# Patient Record
Sex: FEMALE | Race: WHITE | HISPANIC OR LATINO | Employment: OTHER | URBAN - METROPOLITAN AREA
[De-identification: names, ages, dates, MRNs, and addresses within clinical notes are randomized per-mention and may not be internally consistent; named-entity substitution may affect disease eponyms.]

---

## 2017-05-23 ENCOUNTER — TRANSFERRED RECORDS (OUTPATIENT)
Dept: HEALTH INFORMATION MANAGEMENT | Facility: CLINIC | Age: 32
End: 2017-05-23

## 2017-06-01 ENCOUNTER — TRANSFERRED RECORDS (OUTPATIENT)
Dept: HEALTH INFORMATION MANAGEMENT | Facility: CLINIC | Age: 32
End: 2017-06-01

## 2017-06-05 ENCOUNTER — MEDICAL CORRESPONDENCE (OUTPATIENT)
Dept: HEALTH INFORMATION MANAGEMENT | Facility: CLINIC | Age: 32
End: 2017-06-05

## 2017-06-21 DIAGNOSIS — H47.10 PAPILLEDEMA: ICD-10-CM

## 2017-06-21 DIAGNOSIS — H53.10 SUBJECTIVE VISUAL DISTURBANCE: ICD-10-CM

## 2017-06-22 ENCOUNTER — OFFICE VISIT (OUTPATIENT)
Dept: OPHTHALMOLOGY | Facility: CLINIC | Age: 32
End: 2017-06-22
Attending: OPHTHALMOLOGY
Payer: COMMERCIAL

## 2017-06-22 DIAGNOSIS — H53.10 SUBJECTIVE VISUAL DISTURBANCE: ICD-10-CM

## 2017-06-22 DIAGNOSIS — H47.333 PSEUDOPAPILLEDEMA, BILATERAL: Primary | ICD-10-CM

## 2017-06-22 PROCEDURE — 99212 OFFICE O/P EST SF 10 MIN: CPT | Mod: ZF

## 2017-06-22 PROCEDURE — 92083 EXTENDED VISUAL FIELD XM: CPT | Mod: ZF | Performed by: OPHTHALMOLOGY

## 2017-06-22 PROCEDURE — 92133 CPTRZD OPH DX IMG PST SGM ON: CPT | Mod: ZF | Performed by: OPHTHALMOLOGY

## 2017-06-22 ASSESSMENT — VISUAL ACUITY
OS_CC: J1+
OD_CC: 20/20
OD_CC+: -1
CORRECTION_TYPE: GLASSES
OS_CC: 20/20
OD_CC: J1+
METHOD: SNELLEN - LINEAR

## 2017-06-22 ASSESSMENT — REFRACTION_WEARINGRX
OS_AXIS: 074
OD_SPHERE: -4.50
OD_AXIS: 100
OD_CYLINDER: +0.50
OS_SPHERE: -4.25
SPECS_TYPE: SVL
OS_CYLINDER: +0.50

## 2017-06-22 ASSESSMENT — CUP TO DISC RATIO
OD_RATIO: 0.1
OS_RATIO: 0.1

## 2017-06-22 ASSESSMENT — REFRACTION_MANIFEST
OS_CYLINDER: +0.50
OS_AXIS: 074
OS_SPHERE: -4.25
OD_CYLINDER: +0.50
OD_AXIS: 100
OD_SPHERE: -4.50

## 2017-06-22 ASSESSMENT — SLIT LAMP EXAM - LIDS
COMMENTS: NORMAL
COMMENTS: NORMAL

## 2017-06-22 ASSESSMENT — EXTERNAL EXAM - RIGHT EYE: OD_EXAM: NORMAL

## 2017-06-22 ASSESSMENT — EXTERNAL EXAM - LEFT EYE: OS_EXAM: NORMAL

## 2017-06-22 ASSESSMENT — TONOMETRY
OS_IOP_MMHG: 20
OD_IOP_MMHG: 20
IOP_METHOD: TONOPEN

## 2017-06-22 ASSESSMENT — CONF VISUAL FIELD
OD_NORMAL: 1
OS_NORMAL: 1

## 2017-06-22 NOTE — NURSING NOTE
Chief Complaints and History of Present Illnesses   Patient presents with     Follow Up For     s/p Papilledema; Subjective visual disturbance     HPI    Affected eye(s):  Both   Symptoms:     No blurred vision   No decreased vision   No distorted vision   No double vision   No floaters   Flashes (Comment:  OU when headaches are intense.)      Duration:  3 months   Frequency:  Constant       Do you have eye pain now?:  No      Comments:  Pt. stated constant headache for the last 3 months along with the sensation of pressure behind left eye and intermittent stabbing pain from movement of her head. No double vision.  Jeramy Valencia  2:42 PM June 22, 2017

## 2017-06-22 NOTE — PROGRESS NOTES
ATTENDING ASSESSMENT AND PLAN:       1. Clinical concern for pseudotumor cerebri based upon symptoms and elevated opening pressure on lumbar puncture.  I think pseudotumor cerebri without papilledema is highly unlikely in her case and believe she has a primary headache disorder.  2. No evidence of papilledema on exam- normal neuro-ophthalmologic exam today    Tobi Dominguez is a pleasant 32 year old female who presents to my neuro-ophthalmology clinic today for evaluation of possible pseudotumour cerebri. She started to have worsening headaches, that she describes as a very short episodes of sharp, stabbing headache at the left side of her head that is usually triggered with valsalva maneuvers. She has longstanding migraine for many years. She has pulsatile tinnitus bilaterally. She recalls an episode of vision loss in the left eye that lasted for about 2 hours upon wakening up and that happened about 2 months ago- her vision then resolved back to baseline. She was sent to see Dr. Rothman about a month ago. She had a brain MRI/MRV/MRA which was unremarkable and lumbar puncture with opening pressure 32 cm H2O in prone position. Gained about 10 lbs over the last year. No medications associated with increased intracranial pressure.  Last eye exam was March or April of 2016, no papilledema noted at that time (or in the past).    Automated visual field testing in both sides was full. OCT of the peripapillary nerve fibre layer was normal.     In conclusion, Mrs. Dominguez presents today for evaluation of headache with possible pseudotumor cerebri. Today, her examination showed NO PAPILLEDEMA in either eye nor was there any subtle suggestion of prior / healed optic disc edema. We have discussed that pseudotumor cerebri without papilledema is exceptionally rare while primary headache disorder is very common.  I suspect her elevated opening pressure was falsely elevated.  While she could in theory have pseudotumor cerebri, it  is highly unlikely.  I suggest she attempt weight loss as 10% of weight loss from the time of diagnosis of pseudotumor cerebri (in patient's with definitive pseudotumor cerebri) leads to a resolution of symptoms in most patients.  If she were to have pseudotumor cerebri without papilledema she would be at NO risk whatsoever for vision loss as the mechanism for vision loss in pseudotumor cerebri is papilledema.     I recommended the patient follow-up again with her Neurologist from [facility] and consider prophylaxis headache treatment.  It sounds like her Neurologist wanted to start topiramate but the patient declined that recommendation previously.  I advised her that I agree that this would be an excellent medication for her given it's benefits for increased intracranial pressure (which I believe is a minimal factor in her case), weight loss, and most primary headache disorders.  Diamox, however, is not particularly effective for most primary headache disorders.    I did not make a follow-up appointment, but I would be happy to see the patient back in the future should any new neuro-ophthalmic concern arise or should she have significant vision changes.        Complete documentation of historical and exam elements from today's encounter can be found in the full encounter summary report (not reduplicated in this progress note).  I personally obtained the chief complaint(s) and history of present illness.  I confirmed and edited as necessary the review of systems, past medical/surgical history, family history, social history, and examination findings as documented by others; and I examined the patient myself.  I personally reviewed the relevant tests, images, and reports as documented above.  I formulated and edited as necessary the assessment and plan and discussed the findings and management plan with the patient and family   Jose Brar MD

## 2017-06-22 NOTE — LETTER
2017    RE: Tobi Dominguez  : 1985  MRN: [id number]    Dear JERRY Rothman,    Thank you for referring your patient, Tobi Dominguez, to my neuro-ophthalmology clinic recently.  After a thorough neuro-ophthalmic history and examination, I came to the following conclusions:     1. Clinical concern for pseudotumor cerebri based upon symptoms and elevated opening pressure on lumbar puncture.  I think pseudotumor cerebri without papilledema is highly unlikely in her case and believe she has a primary headache disorder.  2. No evidence of papilledema on exam- normal neuro-ophthalmologic exam today    Tobi Dominguez is a pleasant 32 year old female who presents to my neuro-ophthalmology clinic today for evaluation of possible pseudotumour cerebri. She started to have worsening headaches, that she describes as a very short episodes of sharp, stabbing headache at the left side of her head that is usually triggered with valsalva maneuvers. She has longstanding migraine for many years. She has pulsatile tinnitus bilaterally. She recalls an episode of vision loss in the left eye that lasted for about 2 hours upon wakening up and that happened about 2 months ago- her vision then resolved back to baseline. She was sent to see Dr. Rothman about a month ago. She had a brain MRI/MRV/MRA which was unremarkable and lumbar puncture with opening pressure 32 cm H2O in prone position. Gained about 10 lbs over the last year. No medications associated with increased intracranial pressure.  Last eye exam was March or 2016, no papilledema noted at that time (or in the past).    Automated visual field testing in both sides was full. OCT of the peripapillary nerve fibre layer was normal.     In conclusion, Mrs. Dominguez presents today for evaluation of headache with possible pseudotumor cerebri. Today, her examination showed NO PAPILLEDEMA in either eye nor was there any subtle suggestion of prior / healed optic disc edema.  We have discussed that pseudotumor cerebri without papilledema is exceptionally rare while primary headache disorder is very common.  I suspect her elevated opening pressure was falsely elevated.  While she could in theory have pseudotumor cerebri, it is highly unlikely.  I suggest she attempt weight loss as 10% of weight loss from the time of diagnosis of pseudotumor cerebri (in patient's with definitive pseudotumor cerebri) leads to a resolution of symptoms in most patients.  If she were to have pseudotumor cerebri without papilledema she would be at NO risk whatsoever for vision loss as the mechanism for vision loss in pseudotumor cerebri is papilledema.     I recommended the patient follow-up again with her Neurologist from [facility] and consider prophylaxis headache treatment.  It sounds like her Neurologist wanted to start topiramate but the patient declined that recommendation previously.  I advised her that I agree that this would be an excellent medication for her given it's benefits for increased intracranial pressure (which I believe is a minimal factor in her case), weight loss, and most primary headache disorders.  Diamox, however, is not particularly effective for most primary headache disorders.    I did not make a follow-up appointment, but I would be happy to see the patient back in the future should any new neuro-ophthalmic concern arise or should she have significant vision changes.    Again, thank you for trusting me with the care of your patient.  For further exam details, please feel free to contact our office for additional records.  If you wish to contact me regarding this patient please email me at [email or web address] or give my clinic a call to arrange a phone conversation.    Sincerely,    Jose Brar MD  , Neuro-Ophthalmology and Adult Strabismus  Department of Ophthalmology and Visual Neurosciences  [facility]    DX: primary headache disorder

## 2017-06-22 NOTE — MR AVS SNAPSHOT
After Visit Summary   2017    Tobi Dominguez    MRN: [id number]           Patient Information     Date Of Birth          1985        Visit Information        Provider Department      2017 2:00 PM Jose Brar MD [facility]        Today's Diagnoses     Pseudopapilledema, bilateral - Both Eyes    -  1    Subjective visual disturbance           Follow-ups after your visit        Who to contact     Please call your clinic at [phone] to:    Ask questions about your health    Make or cancel appointments    Discuss your medicines    Learn about your test results    Speak to your doctor   If you have compliments or concerns about an experience at your clinic, or if you wish to file a complaint, please contact [facility] Patient Relations at [phone] or email us at [email or web address]         Additional Information About Your Visit        MyChart Information     [employer] is an electronic gateway that provides easy, online access to your medical records. With RUSBASE, you can request a clinic appointment, read your test results, renew a prescription or communicate with your care team.     To sign up for [employer] visit the website at [email or web address]   You will be asked to enter the access code listed below, as well as some personal information. Please follow the directions to create your username and password.     Your access code is: [email or web address]  Expires: 9/10/2017  6:31 AM     Your access code will  in 90 days. If you need help or a new code, please contact your [facility] Clinic or call [phone] for assistance.        Care EveryWhere ID     This is your Care EveryWhere ID. This could be used by other organizations to access your [place] medical records  [id number]         Blood Pressure from Last 3 Encounters:   10/27/06 118/76    Weight from Last 3 Encounters:   10/27/06 96.6 kg (213  lb)              We Performed the Following     Glaucoma Top OU     OCT Optic Nerve RNFL Spectralis OU (both eyes)        Primary Care Provider    No Ref-Primary Verified       No address on file        Equal Access to Services     MALV[facility] : Hadii aad ku hadjavierabbie Lopez, rena hinaldha, sofía ibarra, molina mcgowandereck stacy. So [place] [phone].    ATENCIÓN: Si habla español, tiene a lee disposición servicios gratuitos de asistencia lingüística. Llame al [phone].    We comply with applicable federal civil rights laws and [place] laws. We do not discriminate on the basis of race, color, national origin, age, disability sex, sexual orientation or gender identity.            Thank you!     Thank you for choosing [facility]  for your care. Our goal is always to provide you with excellent care. Hearing back from our patients is one way we can continue to improve our services. Please take a few minutes to complete the written survey that you may receive in the mail after your visit with us. Thank you!             Your Updated Medication List - Protect others around you: Learn how to safely use, store and throw away your medicines at www.disposemymeds.org.          This list is accurate as of: 6/22/17 11:59 PM.  Always use your most recent med list.                   Brand Name Dispense Instructions for use Diagnosis    PERCOCET 5-325 MG per tablet   Generic drug:  oxyCODONE-acetaminophen     28    ONE TO TWO TABLETS EVERY 4 TO 6 HOURS AS NEEDED FOR PAIN    Abdominal pain, right lower quadrant       VICODIN PO      1 TABLET EVERY 4 TO 6 HOURS AS NEEDED

## 2018-09-09 ENCOUNTER — HOSPITAL ENCOUNTER (EMERGENCY)
Facility: CLINIC | Age: 33
Discharge: HOME | End: 2018-09-09
Attending: FAMILY MEDICINE | Admitting: FAMILY MEDICINE
Payer: COMMERCIAL

## 2018-09-09 ENCOUNTER — APPOINTMENT (OUTPATIENT)
Dept: CT IMAGING | Facility: CLINIC | Age: 33
End: 2018-09-09
Attending: FAMILY MEDICINE
Payer: COMMERCIAL

## 2018-09-09 VITALS
OXYGEN SATURATION: 99 % | RESPIRATION RATE: 20 BRPM | DIASTOLIC BLOOD PRESSURE: 83 MMHG | SYSTOLIC BLOOD PRESSURE: 131 MMHG | HEART RATE: 59 BPM | TEMPERATURE: 97 F

## 2018-09-09 DIAGNOSIS — G89.29 CHRONIC ABDOMINAL PAIN: ICD-10-CM

## 2018-09-09 DIAGNOSIS — R10.9 CHRONIC ABDOMINAL PAIN: ICD-10-CM

## 2018-09-09 LAB
ALBUMIN SERPL-MCNC: 3.4 G/DL (ref 3.4–5)
ALBUMIN UR-MCNC: NEGATIVE MG/DL
ALP SERPL-CCNC: 66 U/L (ref 40–150)
ALT SERPL W P-5'-P-CCNC: 24 U/L (ref 0–50)
ANION GAP SERPL CALCULATED.3IONS-SCNC: 10 MMOL/L (ref 3–14)
APPEARANCE UR: CLEAR
AST SERPL W P-5'-P-CCNC: 16 U/L (ref 0–45)
BASOPHILS # BLD AUTO: 0 10E9/L (ref 0–0.2)
BASOPHILS NFR BLD AUTO: 0.5 %
BILIRUB SERPL-MCNC: 0.3 MG/DL (ref 0.2–1.3)
BILIRUB UR QL STRIP: NEGATIVE
BUN SERPL-MCNC: 13 MG/DL (ref 7–30)
CALCIUM SERPL-MCNC: 8.8 MG/DL (ref 8.5–10.1)
CHLORIDE SERPL-SCNC: 105 MMOL/L (ref 94–109)
CO2 SERPL-SCNC: 29 MMOL/L (ref 20–32)
COLOR UR AUTO: COLORLESS
CREAT SERPL-MCNC: 0.63 MG/DL (ref 0.52–1.04)
DIFFERENTIAL METHOD BLD: ABNORMAL
EOSINOPHIL NFR BLD AUTO: 2.4 %
ERYTHROCYTE [DISTWIDTH] IN BLOOD BY AUTOMATED COUNT: 12.2 % (ref 10–15)
GFR SERPL CREATININE-BSD FRML MDRD: >90 ML/MIN/1.7M2
GLUCOSE SERPL-MCNC: 85 MG/DL (ref 70–99)
GLUCOSE UR STRIP-MCNC: NEGATIVE MG/DL
HCT VFR BLD AUTO: 44.2 % (ref 35–47)
HGB BLD-MCNC: 14.7 G/DL (ref 11.7–15.7)
HGB UR QL STRIP: NEGATIVE
IMM GRANULOCYTES # BLD: 0 10E9/L (ref 0–0.4)
IMM GRANULOCYTES NFR BLD: 0.1 %
KETONES UR STRIP-MCNC: NEGATIVE MG/DL
LEUKOCYTE ESTERASE UR QL STRIP: NEGATIVE
LIPASE SERPL-CCNC: 150 U/L (ref 73–393)
LYMPHOCYTES # BLD AUTO: 2.8 10E9/L (ref 0.8–5.3)
LYMPHOCYTES NFR BLD AUTO: 38.5 %
MCH RBC QN AUTO: 28.1 PG (ref 26.5–33)
MCHC RBC AUTO-ENTMCNC: 33.3 G/DL (ref 31.5–36.5)
MCV RBC AUTO: 85 FL (ref 78–100)
MONOCYTES # BLD AUTO: 0.5 10E9/L (ref 0–1.3)
MONOCYTES NFR BLD AUTO: 7.1 %
NEUTROPHILS # BLD AUTO: 3.8 10E9/L (ref 1.6–8.3)
NEUTROPHILS NFR BLD AUTO: 51.4 %
NITRATE UR QL: NEGATIVE
NRBC # BLD AUTO: 0 10*3/UL
NRBC BLD AUTO-RTO: 0 /100
PH UR STRIP: 6 PH (ref 5–7)
PLATELET # BLD AUTO: 313 10E9/L (ref 150–450)
POTASSIUM SERPL-SCNC: 3.8 MMOL/L (ref 3.4–5.3)
PROT SERPL-MCNC: 7.1 G/DL (ref 6.8–8.8)
RBC # BLD AUTO: 5.23 10E12/L (ref 3.8–5.2)
RBC #/AREA URNS AUTO: 0 /HPF (ref 0–2)
SODIUM SERPL-SCNC: 144 MMOL/L (ref 133–144)
SOURCE: ABNORMAL
SP GR UR STRIP: 1.04 (ref 1–1.03)
SQUAMOUS #/AREA URNS AUTO: 1 /HPF (ref 0–1)
UROBILINOGEN UR STRIP-MCNC: 0 MG/DL (ref 0–2)
WBC # BLD AUTO: 7.4 10E9/L (ref 4–11)
WBC #/AREA URNS AUTO: <1 /HPF (ref 0–5)

## 2018-09-09 PROCEDURE — 99284 EMERGENCY DEPT VISIT MOD MDM: CPT | Mod: Z6 | Performed by: FAMILY MEDICINE

## 2018-09-09 PROCEDURE — 25000128 H RX IP 250 OP 636: Performed by: FAMILY MEDICINE

## 2018-09-09 PROCEDURE — 85025 COMPLETE CBC W/AUTO DIFF WBC: CPT | Performed by: FAMILY MEDICINE

## 2018-09-09 PROCEDURE — 25000125 ZZHC RX 250: Performed by: FAMILY MEDICINE

## 2018-09-09 PROCEDURE — 80053 COMPREHEN METABOLIC PANEL: CPT | Performed by: FAMILY MEDICINE

## 2018-09-09 PROCEDURE — 74177 CT ABD & PELVIS W/CONTRAST: CPT

## 2018-09-09 PROCEDURE — 99285 EMERGENCY DEPT VISIT HI MDM: CPT | Mod: 25 | Performed by: FAMILY MEDICINE

## 2018-09-09 PROCEDURE — 83690 ASSAY OF LIPASE: CPT | Performed by: FAMILY MEDICINE

## 2018-09-09 PROCEDURE — 81001 URINALYSIS AUTO W/SCOPE: CPT | Performed by: FAMILY MEDICINE

## 2018-09-09 RX ORDER — IOPAMIDOL 755 MG/ML
100 INJECTION, SOLUTION INTRAVASCULAR ONCE
Status: COMPLETED | OUTPATIENT
Start: 2018-09-09 | End: 2018-09-09

## 2018-09-09 RX ADMIN — SODIUM CHLORIDE 1000 ML: 9 INJECTION, SOLUTION INTRAVENOUS at 01:59

## 2018-09-09 RX ADMIN — IOPAMIDOL 99 ML: 755 INJECTION, SOLUTION INTRAVENOUS at 01:00

## 2018-09-09 RX ADMIN — SODIUM CHLORIDE 60 ML: 9 INJECTION, SOLUTION INTRAVENOUS at 01:00

## 2018-09-09 NOTE — ED AVS SNAPSHOT
[facility]    [address] [place] [address]    Phone:  [phone]    Fax:  [phone]                                       Tobi Dominguez   MRN: [id number]    Department:  [facility]   Date of Visit:  9/8/2018           Patient Information     Date Of Birth          1985        Your diagnoses for this visit were:     Chronic abdominal pain        You were seen by Vineet Leblanc MD.      Follow-up Information     Follow up with Your doctor. Schedule an appointment as soon as possible for a visit in 5 days.    Why:  If not improving.      Discharge References/Attachments     ABDOMINAL PAIN, UNKNOWN CAUSE, (FEMALE) (ENGLISH)      24 Hour Appointment Hotline       To make an appointment at any [facility] clinic, call [phone] ([phone]). If you don't have a family doctor or clinic, we will help you find one. [facility] clinics are conveniently located to serve the needs of you and your family.             Review of your medicines      Our records show that you are taking the medicines listed below. If these are incorrect, please call your family doctor or clinic.        Dose / Directions Last dose taken    PERCOCET 5-325 MG per tablet   Quantity:  28   Generic drug:  oxyCODONE-acetaminophen        ONE TO TWO TABLETS EVERY 4 TO 6 HOURS AS NEEDED FOR PAIN   Refills:  0        VICODIN PO        1 TABLET EVERY 4 TO 6 HOURS AS NEEDED   Refills:  0                Procedures and tests performed during your visit     CBC with platelets differential    CT Abdomen Pelvis w Contrast    Comprehensive metabolic panel    Give 20 ounces of water 15 minutes before CT of abdomen    Lipase    Peripheral IV catheter    UA with Microscopic      Orders Needing Specimen Collection     None      Pending Results     No orders found from 9/7/2018 to 9/10/2018.            Pending Culture Results     No orders found from 9/7/2018 to 9/10/2018.     "        Pending Results Instructions     If you had any lab results that were not finalized at the time of your Discharge, you can call the ED Lab Result RN at [phone]. You will be contacted by this team for any positive Lab results or changes in treatment. The nurses are available 7 days a week from 10A to 6:30P.  You can leave a message 24 hours per day and they will return your call.        Thank you for choosing [facility]       Thank you for choosing [facility] for your care. Our goal is always to provide you with excellent care. Hearing back from our patients is one way we can continue to improve our services. Please take a few minutes to complete the written survey that you may receive in the mail after you visit with us. Thank you!        [employer] Information     [employer] lets you send messages to your doctor, view your test results, renew your prescriptions, schedule appointments and more. To sign up, go to [email or web address] . Click on \"Log in\" on the left side of the screen, which will take you to the Welcome page. Then click on \"Sign up Now\" on the right side of the page.     You will be asked to enter the access code listed below, as well as some personal information. Please follow the directions to create your username and password.     Your access code is: [email or web address]  Expires: 2018  2:19 AM     Your access code will  in 90 days. If you need help or a new code, please call your [facility] clinic or [phone].        Care EveryWhere ID     This is your Care EveryWhere ID. This could be used by other organizations to access your [facility] medical records  [id number]        Equal Access to Services     [facility]: Hadii bailey Lopez, wadesireeda luqadaha, qajoseline stacyalmolina chadwick. So [facility] [phone].    ATENCIÓN: Si habla español, tiene a lee disposición servicios gratuitos de asistencia lingüística. Llame al [phone].    We " comply with applicable federal civil rights laws and [place] laws. We do not discriminate on the basis of race, color, national origin, age, disability, sex, sexual orientation, or gender identity.            After Visit Summary       This is your record. Keep this with you and show to your community pharmacist(s) and doctor(s) at your next visit.

## 2018-09-09 NOTE — ED TRIAGE NOTES
Pt had a colonoscopy on Friday via [facility]. A biopsy was done. Now have acute abd pain.

## 2018-09-09 NOTE — ED AVS SNAPSHOT
[facility]    [address] [place] [address]    Phone:  [phone]    Fax:  [phone]                                       Tobi Dominguez   MRN: [id number]    Department:  [facility]   Date of Visit:  9/8/2018           After Visit Summary Signature Page     I have received my discharge instructions, and my questions have been answered. I have discussed any challenges I see with this plan with the nurse or doctor.    ..........................................................................................................................................  Patient/Patient Representative Signature      ..........................................................................................................................................  Patient Representative Print Name and Relationship to Patient    ..................................................               ................................................  Date                                            Time    ..........................................................................................................................................  Reviewed by Signature/Title    ...................................................              ..............................................  Date                                                            Time          22EPIC Rev 08/18

## 2018-09-09 NOTE — ED PROVIDER NOTES
History     Chief Complaint   Patient presents with     Abdominal Pain     HPI  Tobi Dominguez is a 33 year old female who presents with abdominal pain that started after recent colonoscopy.  Patient had a colonoscopy done 36 hours ago.  This is done for evaluation of some chronic abdominal pain.  A biopsy was done.  Patient states that she did have some bloody stools on Friday but then none on Saturday.  Patient states the pain started relatively soon after the procedure.  Is been getting progressively worse.  She states the pain is mostly in the lower abdomen area.  Nothing seems to make the pain better or worse.    Problem List:    Patient Active Problem List    Diagnosis Date Noted     Ovarian cyst 10/27/2006     Priority: Medium     Problem list name updated by automated process. Provider to review          Past Medical History:    Past Medical History:   Diagnosis Date     Other forms of migraine, with intractable migraine, so stated, without mention of status migrainosus        Past Surgical History:    Past Surgical History:   Procedure Laterality Date     C  DELIVERY ONLY         Family History:    Family History   Problem Relation Age of Onset     Neurologic Disorder Mother      maigraines     Alcohol/Drug Father      Genitourinary Problems Son      kidney condition       Social History:  Marital Status:   [2]  Social History   Substance Use Topics     Smoking status: Never Smoker     Smokeless tobacco: Not on file     Alcohol use No        Medications:      PERCOCET 5-325 MG OR TABS   VICODIN OR         Review of Systems   All other systems reviewed and are negative.      Physical Exam   BP: 131/83  Pulse: 59  Temp: 97  F (36.1  C)  Resp: 20  SpO2: 99 %      Physical Exam   Constitutional: She appears well-developed and well-nourished. No distress.   HENT:   Head: Normocephalic and atraumatic.   Mouth/Throat: Oropharynx is clear and moist. No oropharyngeal exudate.   Eyes: Conjunctivae  are normal.   Neck: Normal range of motion.   Cardiovascular: Normal rate, regular rhythm and normal heart sounds.    No murmur heard.  Pulmonary/Chest: Effort normal and breath sounds normal. No respiratory distress. She has no wheezes.   Abdominal: Soft. Bowel sounds are normal. She exhibits no distension. There is tenderness (suprapubic). There is no rebound and no guarding.   Musculoskeletal: Normal range of motion. She exhibits no edema.   Skin: She is not diaphoretic.   Nursing note and vitals reviewed.      ED Course     ED Course     Procedures           Results for orders placed or performed during the hospital encounter of 09/09/18   CT Abdomen Pelvis w Contrast    Narrative    CT ABDOMEN AND PELVIS WITH CONTRAST   9/9/2018 1:12 AM     HISTORY: Recent colonoscopy with biopsy. Abdominal pain.    COMPARISON: 11/1/2006.    TECHNIQUE: Following the uneventful administration of 99 mL Isovue-370  intravenous contrast, helical sections were acquired from the top of  the diaphragm through the pubic symphysis. Coronal reconstructions  were generated. Radiation dose for this scan was reduced using  automated exposure control, adjustment of the mA and/or kV according  to the patient's size, or iterative reconstruction technique.    FINDINGS:     ABDOMEN: The liver, spleen, pancreas, adrenal glands and right kidney  are unremarkable. Subcentimeter low-attenuation lesion in the upper  pole of the left kidney, too small to characterize. The gallbladder is  present. No enlarged lymph nodes or free fluid in the upper abdomen.    Scan through the lower chest is unremarkable.    Pelvis: The small and large bowel are normal in caliber. The appendix  is not visualized. No bowel wall thickening, pneumatosis or free  intraperitoneal gas. The uterus is not visualized. No enlarged lymph  nodes or free fluid in the pelvis.      Impression    IMPRESSION:  1. No cause of acute pain identified in the abdomen or pelvis.  2. No  evidence of bowel perforation.   CBC with platelets differential   Result Value Ref Range    WBC 7.4 4.0 - 11.0 10e9/L    RBC Count 5.23 (H) 3.8 - 5.2 10e12/L    Hemoglobin 14.7 11.7 - 15.7 g/dL    Hematocrit 44.2 35.0 - 47.0 %    MCV 85 78 - 100 fl    MCH 28.1 26.5 - 33.0 pg    MCHC 33.3 31.5 - 36.5 g/dL    RDW 12.2 10.0 - 15.0 %    Platelet Count 313 150 - 450 10e9/L    Diff Method Automated Method     % Neutrophils 51.4 %    % Lymphocytes 38.5 %    % Monocytes 7.1 %    % Eosinophils 2.4 %    % Basophils 0.5 %    % Immature Granulocytes 0.1 %    Nucleated RBCs 0 0 /100    Absolute Neutrophil 3.8 1.6 - 8.3 10e9/L    Absolute Lymphocytes 2.8 0.8 - 5.3 10e9/L    Absolute Monocytes 0.5 0.0 - 1.3 10e9/L    Absolute Basophils 0.0 0.0 - 0.2 10e9/L    Abs Immature Granulocytes 0.0 0 - 0.4 10e9/L    Absolute Nucleated RBC 0.0    Comprehensive metabolic panel   Result Value Ref Range    Sodium 144 133 - 144 mmol/L    Potassium 3.8 3.4 - 5.3 mmol/L    Chloride 105 94 - 109 mmol/L    Carbon Dioxide 29 20 - 32 mmol/L    Anion Gap 10 3 - 14 mmol/L    Glucose 85 70 - 99 mg/dL    Urea Nitrogen 13 7 - 30 mg/dL    Creatinine 0.63 0.52 - 1.04 mg/dL    GFR Estimate >90 >60 mL/min/1.7m2    GFR Estimate If Black >90 >60 mL/min/1.7m2    Calcium 8.8 8.5 - 10.1 mg/dL    Bilirubin Total 0.3 0.2 - 1.3 mg/dL    Albumin 3.4 3.4 - 5.0 g/dL    Protein Total 7.1 6.8 - 8.8 g/dL    Alkaline Phosphatase 66 40 - 150 U/L    ALT 24 0 - 50 U/L    AST 16 0 - 45 U/L   Lipase   Result Value Ref Range    Lipase 150 73 - 393 U/L   UA with Microscopic   Result Value Ref Range    Color Urine Colorless     Appearance Urine Clear     Glucose Urine Negative NEG^Negative mg/dL    Bilirubin Urine Negative NEG^Negative    Ketones Urine Negative NEG^Negative mg/dL    Specific Gravity Urine 1.042 (H) 1.003 - 1.035    Blood Urine Negative NEG^Negative    pH Urine 6.0 5.0 - 7.0 pH    Protein Albumin Urine Negative NEG^Negative mg/dL    Urobilinogen mg/dL 0.0 0.0 - 2.0  mg/dL    Nitrite Urine Negative NEG^Negative    Leukocyte Esterase Urine Negative NEG^Negative    Source Unspecified Urine     WBC Urine <1 0 - 5 /HPF    RBC Urine 0 0 - 2 /HPF    Squamous Epithelial /HPF Urine 1 0 - 1 /HPF     Medications   0.9% sodium chloride BOLUS (1,000 mLs Intravenous New Bag 9/9/18 0159)   sodium chloride (PF) 0.9% PF flush 3 mL (3 mLs Intracatheter Given 9/9/18 0100)   iopamidol (ISOVUE-370) solution 100 mL (99 mLs Intravenous Given 9/9/18 0100)   sodium chloride 0.9 % bag 250mL for CT scan flush use (60 mLs Intravenous Given 9/9/18 0100)     Colonoscopy shows no signs of any complications.  Labs and urine were unremarkable.  At this point I do not have a good explanation for the patient's abdominal pain but it seems like we have ruled out any emergent medical condition.  This could be an exacerbation of her chronic abdominal pain.  This also could be more gas pain from her recent colonoscopy.  At this point it is safe to discharge the patient home.  Patient will follow-up with her doctor next week as previously scheduled.    Assessments & Plan (with Medical Decision Making)  Chronic abdominal pain     I have reviewed the nursing notes.    I have reviewed the findings, diagnosis, plan and need for follow up with the patient.        9/8/2018   [facility] EMERGENCY DEPARTMENT     Vineet Leblanc MD  09/09/18 0208

## 2019-01-24 ENCOUNTER — HOSPITAL ENCOUNTER (EMERGENCY)
Facility: CLINIC | Age: 34
Discharge: HOME | End: 2019-01-24
Attending: FAMILY MEDICINE | Admitting: FAMILY MEDICINE
Payer: COMMERCIAL

## 2019-01-24 VITALS
DIASTOLIC BLOOD PRESSURE: 95 MMHG | OXYGEN SATURATION: 96 % | SYSTOLIC BLOOD PRESSURE: 152 MMHG | HEART RATE: 80 BPM | RESPIRATION RATE: 20 BRPM | TEMPERATURE: 98 F

## 2019-01-24 DIAGNOSIS — T78.40XA ALLERGIC REACTION TO DRUG, INITIAL ENCOUNTER: ICD-10-CM

## 2019-01-24 PROCEDURE — 25000132 ZZH RX MED GY IP 250 OP 250 PS 637: Performed by: FAMILY MEDICINE

## 2019-01-24 PROCEDURE — 99283 EMERGENCY DEPT VISIT LOW MDM: CPT | Performed by: FAMILY MEDICINE

## 2019-01-24 PROCEDURE — 99284 EMERGENCY DEPT VISIT MOD MDM: CPT | Mod: Z6 | Performed by: FAMILY MEDICINE

## 2019-01-24 RX ORDER — CETIRIZINE HYDROCHLORIDE 10 MG/1
10 TABLET ORAL 2 TIMES DAILY PRN
Qty: 30 TABLET | Refills: 0 | Status: SHIPPED | OUTPATIENT
Start: 2019-01-24 | End: 2019-02-23

## 2019-01-24 RX ORDER — ACETAMINOPHEN 500 MG
1000 TABLET ORAL
COMMUNITY
Start: 2018-05-08

## 2019-01-24 RX ORDER — CETIRIZINE HYDROCHLORIDE 10 MG/1
10 TABLET ORAL ONCE
Status: COMPLETED | OUTPATIENT
Start: 2019-01-24 | End: 2019-01-24

## 2019-01-24 RX ORDER — PREDNISONE 20 MG/1
40 TABLET ORAL
COMMUNITY
Start: 2019-01-22 | End: 2019-01-24

## 2019-01-24 RX ORDER — VERAPAMIL HYDROCHLORIDE 80 MG/1
80 TABLET ORAL
COMMUNITY
Start: 2019-01-03 | End: 2020-01-03

## 2019-01-24 RX ADMIN — CETIRIZINE HYDROCHLORIDE 10 MG: 10 TABLET, FILM COATED ORAL at 00:41

## 2019-01-24 NOTE — ED TRIAGE NOTES
Pt presents with probable medication reaction. Pt took prednisone and Augmentin for URI on Tuesday. Pt took benadryl 4 hours ago.

## 2019-01-24 NOTE — ED PROVIDER NOTES
History     Chief Complaint   Patient presents with     Allergic Reaction     Pt took amoxicillin and prednison at 1200. Itching, tingling. Taking for URI.      HPI  Tobi Dominguez is a 34 year old female who resents to the ED this morning by ambulance with a possible allergic reaction to medication.    She has had an upper respiratory infection with some sinus congestion for the past couple of weeks with low-grade fever.  She was seen in a clinic the Tuesday night and prescribed Augmentin for possible sinusitis and prednisone because she had some wheezing.  It was too late to get her prescriptions filled so she did not take the first dose of either the medications until Wednesday around noon, just over 12 hours ago.  Shortly thereafter she started getting itching and swelling of the bottoms of her feet.  Felt like there was something in her shoe.  She denied ever having any throat swelling, wheezing, abdominal discomfort or diarrhea.  Never developed any rash.      She called the triage nurse at 6:12 PM and was told to take Benadryl and if not improved or worse to go to the ED.  At that time her heart rate was 102 as measured by her blood pressure machine.  She called the triage nurse again at 11:14 PM tonight and her blood pressure at that time was 187/124 with a heart rate of 120.  She had a slight cough and felt a little short of breath and the triage nurse told her to call 911.    She was stable in route.  Last time she took Benadryl was about 4 hours ago.  She states that the swelling and itchiness of the soles of her feet has settled down.  She denies any swelling sensation of the throat.  No shortness of breath or wheezing.  No hives or rash.  She is not certain why she was told to call an ambulance.        Allergies:  Allergies   Allergen Reactions     Hydromorphone Cough and Shortness Of Breath     Other reaction(s): Difficulty breathing     Augmentin      Cefuroxime      Other reaction(s): Edema  Hands  swell up     Cipro [Ciprofloxacin Hydrochloride] Difficulty breathing     Ciprofloxacin      Other reaction(s): Other (see comments)  No reaction data in [facility]     Lactose      Other reaction(s): GI Upset  Stomach issues     Morphine Anxiety       Problem List:    Patient Active Problem List    Diagnosis Date Noted     Ovarian cyst 10/27/2006     Priority: Medium     Problem list name updated by automated process. Provider to review          Past Medical History:    Past Medical History:   Diagnosis Date     Other forms of migraine, with intractable migraine, so stated, without mention of status migrainosus        Past Surgical History:    Past Surgical History:   Procedure Laterality Date     C  DELIVERY ONLY         Family History:    Family History   Problem Relation Age of Onset     Neurologic Disorder Mother         maigraines     Alcohol/Drug Father      Genitourinary Problems Son         kidney condition       Social History:  Marital Status:   [2]  Social History     Tobacco Use     Smoking status: Never Smoker   Substance Use Topics     Alcohol use: No     Drug use: No        Medications:      acetaminophen (TYLENOL) 500 MG tablet   cetirizine (ZYRTEC) 10 MG tablet   verapamil (CALAN) 80 MG tablet   vitamin D3 (D3-50) 74754 units capsule   PERCOCET 5-325 MG OR TABS   VICODIN OR         Review of Systems   All other systems reviewed and are negative.      Physical Exam   BP: (!) 162/117  Pulse: 105  Temp: 98  F (36.7  C)  Resp: 20  SpO2: 97 %      Physical Exam   Constitutional: She is oriented to person, place, and time. She appears well-developed and well-nourished. No distress.   HENT:   Head: Normocephalic and atraumatic.   Mouth/Throat: Oropharynx is clear and moist.   Eyes: EOM are normal.   Cardiovascular: Regular rhythm. Tachycardia present.   Pulmonary/Chest: Effort normal and breath sounds normal. No respiratory distress. She has no wheezes.   Abdominal: Soft. There is no tenderness.    Musculoskeletal: Normal range of motion.   Neurological: She is alert and oriented to person, place, and time.   Skin: Skin is dry. No rash noted.   Psychiatric: She has a normal mood and affect.   Vitals reviewed.      ED Course  (with Medical Decision Making)    34-year-old female developed itching and swelling of the bottoms of her feet shortly after taking first dose of Augmentin and prednisone at about 12:00 noon just over 12 hours ago.  She did have some improvement in the itching and swelling of her feet after Benadryl orally.  Her heart rate was up a bit and her blood pressure was elevated over baseline and triage nurse suggested that she be seen in the ED.  Her blood pressure still up although not as high as it was at home.  She is mildly tachycardic at 105.  Her lungs are nice and clear.  Throat is clear.  No hives.  The bottoms of her feet look normal.    She tends to be very sensitive to medications and has multiple allergies.  I would suspect this is more likely Augmentin rather than prednisone causing the reaction.  She was given Zyrtec 10 mg orally.  We will watch and see how she does before deciding on disposition.      1:36 AM: She is feeling better and feels like she can make it at home.  Zyrtec prescription was sent.  She is going to stop with both the Augmentin and prednisone.  We discussed nasal saline irrigation for her sinus congestion.  Would hold off in starting any new medications at this time.  Blood pressure has come down a bit.  Heart rate is still in the low 100s.  She has not started her verapamil yet which should help both her hypertension and tachycardia.  I added Augmentin to her allergy list.          Procedures               Critical Care time:  none               No results found for this or any previous visit (from the past 24 hour(s)).    Medications   cetirizine (zyrTEC) tablet 10 mg (10 mg Oral Given 1/24/19 0041)       Assessments & Plan     I have reviewed the nursing  notes.    I have reviewed the findings, diagnosis, plan and need for follow up with the patient.          Medication List      Started    cetirizine 10 MG tablet  Commonly known as:  zyrTEC  10 mg, Oral, 2 TIMES DAILY PRN        Discontinued    amoxicillin-clavulanate 875-125 MG tablet  Commonly known as:  AUGMENTIN     predniSONE 20 MG tablet  Commonly known as:  DELTASONE            Final diagnoses:   Allergic reaction to drug, initial encounter - suspect due to Augmentin       1/24/2019   [facility] EMERGENCY DEPARTMENT     Herb Dewitt MD  01/24/19 0141

## 2019-01-24 NOTE — ED AVS SNAPSHOT
[facility] Emergency Department  [address] [place] [address]  Phone:  [phone]  Fax:  [phone]                                    Tobi Dominguez   MRN: [id number]    Department:  [facility] Emergency Department   Date of Visit:  1/24/2019           After Visit Summary Signature Page    I have received my discharge instructions, and my questions have been answered. I have discussed any challenges I see with this plan with the nurse or doctor.    ..........................................................................................................................................  Patient/Patient Representative Signature      ..........................................................................................................................................  Patient Representative Print Name and Relationship to Patient    ..................................................               ................................................  Date                                   Time    ..........................................................................................................................................  Reviewed by Signature/Title    ...................................................              ..............................................  Date                                               Time          22EPIC Rev 08/18

## 2019-01-24 NOTE — DISCHARGE INSTRUCTIONS
Stop the Augmentin and prednisone.  The Augmentin is most likely the culprit.  Use the Zyrtec as needed for itching/swelling/hives.  Return to the ED if you worsen or have any concerns.  Nasal saline irrigation may be quite helpful for your sinuses.  [employer] is one brand that makes a kit which includes an irrigation bottle and 50 pre-mixed saline packets.  Use distilled water, not tap water.  Recheck in clinic with your primary physician if persistent problems.  It was nice visiting with you tonight.  I am glad you are feeling better and hope you continue to improve.    Thank you for choosing [facility]. We appreciate the opportunity to meet your urgent medical needs. Please let us know if we could have done anything to make your stay more satisfying.    After discharge, please closely monitor for any new or worsening symptoms. Return to the Emergency Department if you develop any acute worsening signs or symptoms.    If you had lab work, cultures or imaging studies done during your stay, the final results may still be pending. We will call you if your plan of care needs to change. However, if you are not improving as expected, please follow up with your primary care provider or clinic.     Start any prescription medications that were prescribed to you and take them as directed.     Please see additional handouts that may be pertinent to your condition.

## 2022-04-19 ENCOUNTER — APPOINTMENT (OUTPATIENT)
Dept: GENERAL RADIOLOGY | Facility: CLINIC | Age: 37
End: 2022-04-19
Attending: FAMILY MEDICINE
Payer: COMMERCIAL

## 2022-04-19 ENCOUNTER — HOSPITAL ENCOUNTER (EMERGENCY)
Facility: CLINIC | Age: 37
Discharge: HOME | End: 2022-04-19
Attending: FAMILY MEDICINE | Admitting: FAMILY MEDICINE
Payer: COMMERCIAL

## 2022-04-19 VITALS
HEART RATE: 66 BPM | WEIGHT: 178 LBS | HEIGHT: 61 IN | RESPIRATION RATE: 16 BRPM | DIASTOLIC BLOOD PRESSURE: 79 MMHG | BODY MASS INDEX: 33.61 KG/M2 | OXYGEN SATURATION: 100 % | TEMPERATURE: 98.5 F | SYSTOLIC BLOOD PRESSURE: 113 MMHG

## 2022-04-19 DIAGNOSIS — R07.9 CHEST PAIN, UNSPECIFIED TYPE: ICD-10-CM

## 2022-04-19 LAB
ALBUMIN SERPL-MCNC: 3.2 G/DL (ref 3.4–5)
ALP SERPL-CCNC: 50 U/L (ref 40–150)
ALT SERPL W P-5'-P-CCNC: 16 U/L (ref 0–50)
ANION GAP SERPL CALCULATED.3IONS-SCNC: 4 MMOL/L (ref 3–14)
AST SERPL W P-5'-P-CCNC: 13 U/L (ref 0–45)
BASOPHILS # BLD AUTO: 0.1 10E3/UL (ref 0–0.2)
BASOPHILS NFR BLD AUTO: 1 %
BILIRUB SERPL-MCNC: 0.6 MG/DL (ref 0.2–1.3)
BUN SERPL-MCNC: 10 MG/DL (ref 7–30)
CALCIUM SERPL-MCNC: 8.1 MG/DL (ref 8.5–10.1)
CHLORIDE BLD-SCNC: 112 MMOL/L (ref 94–109)
CO2 SERPL-SCNC: 24 MMOL/L (ref 20–32)
CREAT SERPL-MCNC: 0.66 MG/DL (ref 0.52–1.04)
D DIMER PPP FEU-MCNC: 0.31 UG/ML FEU (ref 0–0.5)
EOSINOPHIL # BLD AUTO: 0.1 10E3/UL (ref 0–0.7)
EOSINOPHIL NFR BLD AUTO: 1 %
ERYTHROCYTE [DISTWIDTH] IN BLOOD BY AUTOMATED COUNT: 12.3 % (ref 10–15)
GFR SERPL CREATININE-BSD FRML MDRD: >90 ML/MIN/1.73M2
GLUCOSE BLD-MCNC: 95 MG/DL (ref 70–99)
HCT VFR BLD AUTO: 43.2 % (ref 35–47)
HGB BLD-MCNC: 15 G/DL (ref 11.7–15.7)
HOLD SPECIMEN: NORMAL
IMM GRANULOCYTES # BLD: 0 10E3/UL
IMM GRANULOCYTES NFR BLD: 0 %
LIPASE SERPL-CCNC: 136 U/L (ref 73–393)
LYMPHOCYTES # BLD AUTO: 1.8 10E3/UL (ref 0.8–5.3)
LYMPHOCYTES NFR BLD AUTO: 36 %
MCH RBC QN AUTO: 29.5 PG (ref 26.5–33)
MCHC RBC AUTO-ENTMCNC: 34.7 G/DL (ref 31.5–36.5)
MCV RBC AUTO: 85 FL (ref 78–100)
MONOCYTES # BLD AUTO: 0.4 10E3/UL (ref 0–1.3)
MONOCYTES NFR BLD AUTO: 8 %
NEUTROPHILS # BLD AUTO: 2.7 10E3/UL (ref 1.6–8.3)
NEUTROPHILS NFR BLD AUTO: 54 %
NRBC # BLD AUTO: 0 10E3/UL
NRBC BLD AUTO-RTO: 0 /100
PLATELET # BLD AUTO: 279 10E3/UL (ref 150–450)
POTASSIUM BLD-SCNC: 4 MMOL/L (ref 3.4–5.3)
PROT SERPL-MCNC: 6.2 G/DL (ref 6.8–8.8)
RBC # BLD AUTO: 5.08 10E6/UL (ref 3.8–5.2)
SODIUM SERPL-SCNC: 140 MMOL/L (ref 133–144)
TROPONIN I SERPL HS-MCNC: 13 NG/L
WBC # BLD AUTO: 5 10E3/UL (ref 4–11)

## 2022-04-19 PROCEDURE — 71046 X-RAY EXAM CHEST 2 VIEWS: CPT

## 2022-04-19 PROCEDURE — 99285 EMERGENCY DEPT VISIT HI MDM: CPT | Mod: 25

## 2022-04-19 PROCEDURE — 84484 ASSAY OF TROPONIN QUANT: CPT | Performed by: FAMILY MEDICINE

## 2022-04-19 PROCEDURE — 36415 COLL VENOUS BLD VENIPUNCTURE: CPT | Performed by: FAMILY MEDICINE

## 2022-04-19 PROCEDURE — 99284 EMERGENCY DEPT VISIT MOD MDM: CPT | Mod: 25 | Performed by: FAMILY MEDICINE

## 2022-04-19 PROCEDURE — 250N000013 HC RX MED GY IP 250 OP 250 PS 637: Performed by: FAMILY MEDICINE

## 2022-04-19 PROCEDURE — 83690 ASSAY OF LIPASE: CPT | Performed by: FAMILY MEDICINE

## 2022-04-19 PROCEDURE — 85379 FIBRIN DEGRADATION QUANT: CPT | Performed by: FAMILY MEDICINE

## 2022-04-19 PROCEDURE — 93010 ELECTROCARDIOGRAM REPORT: CPT | Performed by: FAMILY MEDICINE

## 2022-04-19 PROCEDURE — 80053 COMPREHEN METABOLIC PANEL: CPT | Performed by: FAMILY MEDICINE

## 2022-04-19 PROCEDURE — 85004 AUTOMATED DIFF WBC COUNT: CPT | Performed by: FAMILY MEDICINE

## 2022-04-19 PROCEDURE — 93005 ELECTROCARDIOGRAM TRACING: CPT

## 2022-04-19 RX ORDER — ASPIRIN 81 MG/1
324 TABLET, CHEWABLE ORAL ONCE
Status: COMPLETED | OUTPATIENT
Start: 2022-04-19 | End: 2022-04-19

## 2022-04-19 RX ADMIN — ASPIRIN 81 MG 324 MG: 81 TABLET ORAL at 06:47

## 2022-04-19 NOTE — ED TRIAGE NOTES
Awoken from sleep around 0445 this morning with L sided chest pain and pressure that radiates up into L shoulder and arm. Took 2 extra strength tylenol with no relief.

## 2022-04-19 NOTE — ED PROVIDER NOTES
Transfer of Care Note  This patient was signed out to me and I assumed care from Dr. Meadows at change of shift.  Tobi Dominguez is a 37 year old female who presented to the emergency department with a chief complaint of Chest Pain  .  Please see the original providers history and physical for complete details.    The following issues were signed out to me to follow up on:  Labs and dispostiion      Pertant Lab/Imaging Findings During this Visit was     Results for orders placed or performed during the hospital encounter of 04/19/22 (from the past 24 hour(s))   [place] Draw    Narrative    The following orders were created for panel order [place] Draw.  Procedure                               Abnormality         Status                     ---------                               -----------         ------                     Extra Blue Top Tube[[id number]]                              Final result               Extra Green Top (Lithium...[[id number]]                      Final result               Extra Purple Top Tube[[id number]]                            Final result                 Please view results for these tests on the individual orders.   Extra Blue Top Tube   Result Value Ref Range    Hold Specimen JIC    Extra Green Top (Lithium Heparin) Tube   Result Value Ref Range    Hold Specimen JIC    Extra Purple Top Tube   Result Value Ref Range    Hold Specimen JIC    CBC with platelets differential    Narrative    The following orders were created for panel order CBC with platelets differential.  Procedure                               Abnormality         Status                     ---------                               -----------         ------                     CBC with platelets and d...[[id number]]                      Final result                 Please view results for these tests on the individual orders.   D dimer quantitative   Result Value Ref Range    D-Dimer Quantitative 0.31 0.00 - 0.50 ug/mL FEU     Narrative    This D-dimer assay is intended for use in conjunction with a clinical pretest probability assessment model to exclude pulmonary embolism (PE) and deep venous thrombosis (DVT) in outpatients suspected of PE or DVT. The cut-off value is 0.50 ug/mL FEU.   Comprehensive metabolic panel   Result Value Ref Range    Sodium 140 133 - 144 mmol/L    Potassium 4.0 3.4 - 5.3 mmol/L    Chloride 112 (H) 94 - 109 mmol/L    Carbon Dioxide (CO2) 24 20 - 32 mmol/L    Anion Gap 4 3 - 14 mmol/L    Urea Nitrogen 10 7 - 30 mg/dL    Creatinine 0.66 0.52 - 1.04 mg/dL    Calcium 8.1 (L) 8.5 - 10.1 mg/dL    Glucose 95 70 - 99 mg/dL    Alkaline Phosphatase 50 40 - 150 U/L    AST 13 0 - 45 U/L    ALT 16 0 - 50 U/L    Protein Total 6.2 (L) 6.8 - 8.8 g/dL    Albumin 3.2 (L) 3.4 - 5.0 g/dL    Bilirubin Total 0.6 0.2 - 1.3 mg/dL    GFR Estimate >90 >60 mL/min/1.73m2   Lipase   Result Value Ref Range    Lipase 136 73 - 393 U/L   Troponin I   Result Value Ref Range    Troponin I High Sensitivity 13 <54 ng/L   CBC with platelets and differential   Result Value Ref Range    WBC Count 5.0 4.0 - 11.0 10e3/uL    RBC Count 5.08 3.80 - 5.20 10e6/uL    Hemoglobin 15.0 11.7 - 15.7 g/dL    Hematocrit 43.2 35.0 - 47.0 %    MCV 85 78 - 100 fL    MCH 29.5 26.5 - 33.0 pg    MCHC 34.7 31.5 - 36.5 g/dL    RDW 12.3 10.0 - 15.0 %    Platelet Count 279 150 - 450 10e3/uL    % Neutrophils 54 %    % Lymphocytes 36 %    % Monocytes 8 %    % Eosinophils 1 %    % Basophils 1 %    % Immature Granulocytes 0 %    NRBCs per 100 WBC 0 <1 /100    Absolute Neutrophils 2.7 1.6 - 8.3 10e3/uL    Absolute Lymphocytes 1.8 0.8 - 5.3 10e3/uL    Absolute Monocytes 0.4 0.0 - 1.3 10e3/uL    Absolute Eosinophils 0.1 0.0 - 0.7 10e3/uL    Absolute Basophils 0.1 0.0 - 0.2 10e3/uL    Absolute Immature Granulocytes 0.0 <=0.4 10e3/uL    Absolute NRBCs 0.0 10e3/uL   XR Chest 2 Views    Narrative    CHEST TWO VIEWS  4/19/2022 7:52 AM     HISTORY:  Chest pain.    COMPARISON: None.       Impression    IMPRESSION: Negative chest. Lungs clear.    JUAN MAE MD         SYSTEM ID:  [id number]         My focused follow up physical exam shows:   Vitals:  B/P: 114/83, T: 97.4, P: 56, R: 12  Gen:  Pt appears stable and no apparent distress      ED Course & Medical Decision Making:  Lab work came back unremarkable including a negative D-dimer.  Chest x-ray is also normal.  Pain is reproducible with palpation, patient has minimal cardiac risk factors, I think this is likely noncardiac in nature.  Recommend treating with some anti-inflammatories and rest.  Patient will follow up if there is no improvement over the next few days.         Impression and Disposition:  Atypical chest pain           This note was completed in part using Dragon voice recognition, and may contain word and grammatical errors.        Vineet Leblanc MD  04/19/22 0809

## 2022-04-19 NOTE — ED PROVIDER NOTES
History     Chief Complaint   Patient presents with     Chest Pain     HPI  Tobi Dominguez is a 37 year old female who woke from a deep sleep this morning at 4:45 AM.  She had severe left-sided chest pain/pressure with radiation down the left arm to the fingers and straight through to the back.  She rated 10 out of 10 at its worst.  Has subsided a bit now and she now rates a 6 out of 10.  No associated shortness of breath but she did have some nausea.  No vomiting.  Has been able to vomit since her gastric sleeve surgery, but states that she does have reflux.  She took some Tylenol with throat relief.  Has not taken aspirin.    Does have some underlying health issues with fibromyalgia, migraine headaches/pseudotumor cerebri, chronic fatigue and multiple allergies.    Cardiac risk factors:   Non-smoker  No hypertension- this resolved after her gastric sleeve surgery  Cholesterol in February of this year was 216 but an excellent HDL at 71.  No diabetes  Positive family history.  She believes that her grandmother had early cardiac disease    Primary care at [facility] in [place].      Allergies:  Allergies   Allergen Reactions     Hydromorphone Cough and Shortness Of Breath     Other reaction(s): Difficulty breathing     Augmentin      Cefuroxime      Other reaction(s): Edema  Hands swell up     Cipro [Ciprofloxacin Hydrochloride] Difficulty breathing     Ciprofloxacin      Other reaction(s): Other (see comments)  No reaction data in [facility]     Lactose      Other reaction(s): GI Upset  Stomach issues     Morphine Anxiety       Problem List:    Patient Active Problem List    Diagnosis Date Noted     Ovarian cyst 10/27/2006     Priority: Medium     Problem list name updated by automated process. Provider to review          Past Medical History:    Past Medical History:   Diagnosis Date     Other forms of migraine, with intractable migraine, so stated, without mention of status migrainosus        Past Surgical History:   "  Past Surgical History:   Procedure Laterality Date     C  DELIVERY ONLY         Family History:    Family History   Problem Relation Age of Onset     Neurologic Disorder Mother         maigraines     Alcohol/Drug Father      Genitourinary Problems Son         kidney condition       Social History:  Marital Status:   [2]  Social History     Tobacco Use     Smoking status: Never Smoker   Substance Use Topics     Alcohol use: No     Drug use: No        Medications:    acetaminophen (TYLENOL) 500 MG tablet  PERCOCET 5-325 MG OR TABS  verapamil (CALAN) 80 MG tablet  VICODIN OR  vitamin D3 (D3-50) 72712 units capsule          Review of Systems   All other systems reviewed and are negative.      Physical Exam   BP: 112/84  Pulse: 60  Temp: 97.4  F (36.3  C)  Resp: 16  Height: 154.9 cm (5' 1\")  Weight: 80.7 kg (178 lb)  SpO2: 99 %      Physical Exam  Constitutional:       General: She is not in acute distress.     Appearance: She is well-developed.   HENT:      Mouth/Throat:      Mouth: Mucous membranes are moist.      Pharynx: Oropharynx is clear.   Eyes:      Extraocular Movements: Extraocular movements intact.   Cardiovascular:      Rate and Rhythm: Normal rate and regular rhythm.   Pulmonary:      Effort: Pulmonary effort is normal.      Breath sounds: Normal breath sounds.   Chest:      Chest wall: Tenderness ( mild along sternal borders) present.   Abdominal:      Palpations: Abdomen is soft.      Tenderness: There is no abdominal tenderness.   Musculoskeletal:         General: No tenderness. Normal range of motion.      Right lower leg: No edema.      Left lower leg: No edema.   Skin:     General: Skin is warm and dry.   Neurological:      General: No focal deficit present.      Mental Status: She is alert and oriented to person, place, and time.   Psychiatric:         Mood and Affect: Mood normal.         ED Course                 Procedures              EKG Interpretation:      Interpreted by " Herb Dewitt MD  Time reviewed: 0633  Symptoms at time of EKG: chest pressure   Rhythm: normal sinus   Rate: normal  Axis: normal  Ectopy: none  Conduction: normal  ST Segments/ T Waves: No ST-T wave changes  Q Waves: none  Comparison to prior: No old EKG available    Clinical Impression: normal EKG          Critical Care time:  none               Results for orders placed or performed during the hospital encounter of 04/19/22 (from the past 24 hour(s))   [facility]    Narrative    The following orders were created for panel order [facility].  Procedure                               Abnormality         Status                     ---------                               -----------         ------                     Extra Blue Top Tube[[id number]]                              In process                 Extra Green Top (Lithium...[[id number]]                      In process                 Extra Purple Top Tube[[id number]]                            In process                   Please view results for these tests on the individual orders.   CBC with platelets differential    Narrative    The following orders were created for panel order CBC with platelets differential.  Procedure                               Abnormality         Status                     ---------                               -----------         ------                     CBC with platelets and d...[[id number]]                      Final result                 Please view results for these tests on the individual orders.   D dimer quantitative   Result Value Ref Range    D-Dimer Quantitative 0.31 0.00 - 0.50 ug/mL FEU    Narrative    This D-dimer assay is intended for use in conjunction with a clinical pretest probability assessment model to exclude pulmonary embolism (PE) and deep venous thrombosis (DVT) in outpatients suspected of PE or DVT. The cut-off value is 0.50 ug/mL FEU.   CBC with platelets and differential   Result Value Ref  Range    WBC Count 5.0 4.0 - 11.0 10e3/uL    RBC Count 5.08 3.80 - 5.20 10e6/uL    Hemoglobin 15.0 11.7 - 15.7 g/dL    Hematocrit 43.2 35.0 - 47.0 %    MCV 85 78 - 100 fL    MCH 29.5 26.5 - 33.0 pg    MCHC 34.7 31.5 - 36.5 g/dL    RDW 12.3 10.0 - 15.0 %    Platelet Count 279 150 - 450 10e3/uL    % Neutrophils 54 %    % Lymphocytes 36 %    % Monocytes 8 %    % Eosinophils 1 %    % Basophils 1 %    % Immature Granulocytes 0 %    NRBCs per 100 WBC 0 <1 /100    Absolute Neutrophils 2.7 1.6 - 8.3 10e3/uL    Absolute Lymphocytes 1.8 0.8 - 5.3 10e3/uL    Absolute Monocytes 0.4 0.0 - 1.3 10e3/uL    Absolute Eosinophils 0.1 0.0 - 0.7 10e3/uL    Absolute Basophils 0.1 0.0 - 0.2 10e3/uL    Absolute Immature Granulocytes 0.0 <=0.4 10e3/uL    Absolute NRBCs 0.0 10e3/uL       Medications   aspirin (ASA) chewable tablet 324 mg (324 mg Oral Given 4/19/22 0647)       Assessments & Plan (with Medical Decision Making)  37-year-old female from a deep sleep at 445 this morning with left-sided chest pain/pressure with radiation down the left arm.  Some associated nausea but no vomiting or shortness of breath.  Only cardiac risk factor is possible early cardiac disease in her grandmother.   Initial EKG shows a sinus bradycardia at 53 bpm.  No acute ischemic changes.  Normal EKG.  No old EKGs to compare.  She was given full dose aspirin and labs were sent.  Will decide on imaging once those are back.  Dr. Leblanc assumed care at change of shift.  See his note for final diagnosis and disposition.     I have reviewed the nursing notes.    I have reviewed the findings, diagnosis, plan and need for follow up with the patient.       New Prescriptions    No medications on file       Final diagnoses:   Chest pain, unspecified type       4/19/2022   [facility] EMERGENCY DEPT     Herb Dewitt MD  04/19/22 4232